# Patient Record
Sex: FEMALE | Race: OTHER | NOT HISPANIC OR LATINO | Employment: FULL TIME | URBAN - METROPOLITAN AREA
[De-identification: names, ages, dates, MRNs, and addresses within clinical notes are randomized per-mention and may not be internally consistent; named-entity substitution may affect disease eponyms.]

---

## 2017-02-22 LAB
25(OH)D3+25(OH)D2 SERPL-MCNC: 24.6 NG/ML (ref 30–100)
ALBUMIN SERPL-MCNC: 4.3 G/DL (ref 3.5–5.5)
ALBUMIN/CREAT UR: <11.9 MG/G CREAT (ref 0–30)
ALBUMIN/GLOB SERPL: 1.6 {RATIO} (ref 1.1–2.5)
ALP SERPL-CCNC: 85 IU/L (ref 39–117)
ALT SERPL-CCNC: 14 IU/L (ref 0–32)
AST SERPL-CCNC: 15 IU/L (ref 0–40)
BILIRUB SERPL-MCNC: 0.5 MG/DL (ref 0–1.2)
BUN SERPL-MCNC: 15 MG/DL (ref 6–24)
BUN/CREAT SERPL: 25 (ref 9–23)
CALCIUM SERPL-MCNC: 9.1 MG/DL (ref 8.7–10.2)
CHLORIDE SERPL-SCNC: 102 MMOL/L (ref 96–106)
CHOLEST SERPL-MCNC: 158 MG/DL (ref 100–199)
CO2 SERPL-SCNC: 25 MMOL/L (ref 18–29)
COMMENT 011824: NORMAL
CREAT SERPL-MCNC: 0.6 MG/DL (ref 0.57–1)
CREAT UR-MCNC: 25.3 MG/DL
GLOBULIN SER CALC-MCNC: 2.7 G/DL (ref 1.5–4.5)
GLUCOSE SERPL-MCNC: 96 MG/DL (ref 65–99)
HDLC SERPL-MCNC: 51 MG/DL
LDLC SERPL CALC-MCNC: 83 MG/DL (ref 0–99)
MICROALBUMIN UR-MCNC: <3 UG/ML
POTASSIUM SERPL-SCNC: 4.8 MMOL/L (ref 3.5–5.2)
PROT SERPL-MCNC: 7 G/DL (ref 6–8.5)
SODIUM SERPL-SCNC: 142 MMOL/L (ref 134–144)
T4 FREE SERPL-MCNC: 1.75 NG/DL (ref 0.82–1.77)
TRIGL SERPL-MCNC: 119 MG/DL (ref 0–149)
TSH SERPL DL<=0.005 MIU/L-ACNC: 0.43 UIU/ML (ref 0.45–4.5)
VLDLC SERPL CALC-MCNC: 24 MG/DL (ref 5–40)

## 2017-02-27 ENCOUNTER — OFFICE VISIT (OUTPATIENT)
Dept: MEDICAL GROUP | Facility: PHYSICIAN GROUP | Age: 52
End: 2017-02-27
Payer: COMMERCIAL

## 2017-02-27 VITALS
HEART RATE: 64 BPM | TEMPERATURE: 97 F | HEIGHT: 65 IN | OXYGEN SATURATION: 99 % | SYSTOLIC BLOOD PRESSURE: 122 MMHG | BODY MASS INDEX: 28.16 KG/M2 | WEIGHT: 169 LBS | DIASTOLIC BLOOD PRESSURE: 90 MMHG

## 2017-02-27 DIAGNOSIS — J45.20 MILD INTERMITTENT ASTHMA WITHOUT COMPLICATION: ICD-10-CM

## 2017-02-27 DIAGNOSIS — Z23 NEED FOR DIPHTHERIA-TETANUS-PERTUSSIS (TDAP) VACCINE, ADULT/ADOLESCENT: ICD-10-CM

## 2017-02-27 DIAGNOSIS — I10 ESSENTIAL HYPERTENSION: ICD-10-CM

## 2017-02-27 DIAGNOSIS — E03.9 HYPOTHYROIDISM (ACQUIRED): ICD-10-CM

## 2017-02-27 DIAGNOSIS — J30.89 SEASONAL ALLERGIC RHINITIS DUE TO OTHER ALLERGIC TRIGGER: ICD-10-CM

## 2017-02-27 PROCEDURE — 90471 IMMUNIZATION ADMIN: CPT | Performed by: FAMILY MEDICINE

## 2017-02-27 PROCEDURE — 99214 OFFICE O/P EST MOD 30 MIN: CPT | Mod: 25 | Performed by: FAMILY MEDICINE

## 2017-02-27 PROCEDURE — 90715 TDAP VACCINE 7 YRS/> IM: CPT | Performed by: FAMILY MEDICINE

## 2017-02-27 RX ORDER — ALBUTEROL SULFATE 90 UG/1
2 AEROSOL, METERED RESPIRATORY (INHALATION) EVERY 6 HOURS PRN
Qty: 8.5 G | Refills: 3 | Status: SHIPPED | OUTPATIENT
Start: 2017-02-27 | End: 2018-06-21 | Stop reason: SDUPTHER

## 2017-02-27 RX ORDER — LEVOTHYROXINE SODIUM 0.12 MG/1
TABLET ORAL
Qty: 90 TAB | Refills: 3 | Status: SHIPPED | OUTPATIENT
Start: 2017-02-27 | End: 2018-06-21

## 2017-02-27 RX ORDER — LISINOPRIL AND HYDROCHLOROTHIAZIDE 25; 20 MG/1; MG/1
TABLET ORAL
Qty: 90 TAB | Refills: 3 | Status: SHIPPED | OUTPATIENT
Start: 2017-02-27 | End: 2018-04-19 | Stop reason: SDUPTHER

## 2017-02-27 NOTE — PROGRESS NOTES
Subjective:   Addie Ayala is a 51 y.o. female here today for HTN; thyroid; allergies; asthma    Seasonal allergies  On Going issue. Patient states that she occasionally still has a runny nose with sinus congestion. She does utilize Flonase on occasion. She states that she otherwise has no symptoms such as headache or fevers associated with this. But she is curious if there are any other treatments she could try    Mild intermittent asthma without complication  Ongoing issue. Patient reports that currently she only needs her albuterol inhaler 2-3 times a month. She states that this only occurs if there is a significant weather change which makes her allergies worse. Otherwise she does well denies any issues with shortness of breath or cough.    Hypothyroidism (acquired)  Ongoing issue. Patient reports 100% compliance with levothyroxine 125 µg daily. Patient denies any issues with temperature intolerance, skin changes, anxiety. Chart review shows that while her TSH is slightly below the normal range her T4 is normal. Since patient is asymptomatic, we'll continue her current dosage.    Hypertension  Ongoing issue. Patient reports 100% compliance with lisinopril/hydrochlorothiazide 20/25 mg daily. Patient denies any issues with headache, dizziness, chest pain. Chart review shows that her blood pressure and heart rate both are in the normal range.     Pt is present with her daughter today.    Current medicines (including changes today)  Current Outpatient Prescriptions   Medication Sig Dispense Refill   • albuterol 108 (90 BASE) MCG/ACT Aero Soln inhalation aerosol Inhale 2 Puffs by mouth every 6 hours as needed for Shortness of Breath. 8.5 g 3   • lisinopril-hydrochlorothiazide (PRINZIDE, ZESTORETIC) 20-25 MG per tablet TAKE ONE TABLET BY MOUTH ONCE DAILY 90 Tab 3   • levothyroxine (SYNTHROID) 125 MCG Tab TAKE ONE TABLET BY MOUTH ONCE DAILY IN THE MORNING ON  AN  EMPTY  STOMACH 90 Tab 3   • fluticasone (FLONASE) 50  "MCG/ACT nasal spray Spray 1 Spray in nose every day. 16 g 4     No current facility-administered medications for this visit.     She  has a past medical history of Hypertension; Thyroid disease; Allergy; and Asthma.    ROS   No chest pain, no shortness of breath, no abdominal pain  +brittle nails; sinus congestion     Objective:     Blood pressure 122/90, pulse 64, temperature 36.1 °C (97 °F), height 1.651 m (5' 5\"), weight 76.658 kg (169 lb), SpO2 99 %. Body mass index is 28.12 kg/(m^2).   Physical Exam:  Alert, oriented in no acute distress.  Eye contact is good, speech goal directed, affect calm  HEENT: conjunctiva non-injected, sclera non-icteric.  Pinna normal. TM pearly gray but with faint serous drainage noted behind the R TM  Oral mucous membranes pink and moist with no lesions.  Neck No adenopathy or masses in the neck or supraclavicular regions.  Lungs: clear to auscultation bilaterally with good excursion.  CV: regular rate and rhythm.  Abdomen: soft, nontender, No CVAT  Ext: no edema, color normal, vascularity normal, temperature normal  Neuro: CN 2-12 grossly intact      Assessment and Plan:   The following treatment plan was discussed     1. Seasonal allergic rhinitis due to other allergic trigger      Stable. Continue current medications; recommend use of Netipot as needed to help with symptom management. Monitor   2. Hypothyroidism (acquired)      Stable. Continue current medications; refills provided. Monitor   3. Essential hypertension      Stable. Continue current medications; refills provided. Monitor   4. Mild intermittent asthma without complication      Stable. Continue current medications; refills provided. Monitor   5. Need for diphtheria-tetanus-pertussis (Tdap) vaccine, adult/adolescent  Tdap =>6yo IM    Age-appropriate immunization provided; patient tolerated procedure well.       Followup: Return in about 1 year (around 2/27/2018) for HTN/thyroid/labs, Short.            "

## 2017-02-27 NOTE — MR AVS SNAPSHOT
"        Addie Ayala   2017 7:40 AM   Office Visit   MRN: [id number]    Department:  [facility]   Dept Phone:  [phone]    Description:  Female : 1965   Provider:  Elisabeth Smith D.O.           Reason for Visit     Follow-Up           Allergies as of 2017     No Known Allergies      You were diagnosed with     Seasonal allergic rhinitis due to other allergic trigger   [3091469]       Hypothyroidism (acquired)   [374108]       Essential hypertension   [4193894]       Mild intermittent asthma without complication   [682657]       Need for diphtheria-tetanus-pertussis (Tdap) vaccine, adult/adolescent   [603016]         Vital Signs     Blood Pressure Pulse Temperature Height Weight Body Mass Index    122/90 mmHg 64 36.1 °C (97 °F) 1.651 m (5' 5\") 76.658 kg (169 lb) 28.12 kg/m2    Oxygen Saturation Smoking Status                99% Never Smoker           Basic Information     Date Of Birth Sex Race Ethnicity Preferred Language    1965 Female Other Non- English      Problem List              ICD-10-CM Priority Class Noted - Resolved    Hypothyroidism (acquired) E03.9   2015 - Present    Hypertension I10   2015 - Present    Multiple allergies Z88.9   2015 - Present    Right foot pain M79.671   2015 - Present    Mild intermittent asthma without complication J45.20   2016 - Present    Seasonal allergies J30.2   2016 - Present      Health Maintenance        Date Due Completion Dates    IMM DTaP/Tdap/Td Vaccine (1 - Tdap) 1984 ---    IMM PNEUMOCOCCAL 19-64 (ADULT) MEDIUM RISK SERIES (1 of 1 - PPSV23) 1984 ---    IMM INFLUENZA (1) 2016 ---    MAMMOGRAM 2017    PAP SMEAR 10/7/2019 10/7/2016    COLONOSCOPY 2026            Current Immunizations     Tdap Vaccine  Incomplete      Below and/or attached are the medications your provider expects you to take. Review all of your home medications and newly ordered " medications with your provider and/or pharmacist. Follow medication instructions as directed by your provider and/or pharmacist. Please keep your medication list with you and share with your provider. Update the information when medications are discontinued, doses are changed, or new medications (including over-the-counter products) are added; and carry medication information at all times in the event of emergency situations     Allergies:  No Known Allergies          Medications  Valid as of: February 27, 2017 -  7:54 AM    Generic Name Brand Name Tablet Size Instructions for use    Albuterol Sulfate (Aero Soln) albuterol 108 (90 BASE) MCG/ACT Inhale 2 Puffs by mouth every 6 hours as needed for Shortness of Breath.        Fluticasone Propionate (Suspension) FLONASE 50 MCG/ACT Spray 1 Spray in nose every day.        Levothyroxine Sodium (Tab) SYNTHROID 125 MCG TAKE ONE TABLET BY MOUTH ONCE DAILY IN THE MORNING ON  AN  EMPTY  STOMACH        Lisinopril-Hydrochlorothiazide (Tab) PRINZIDE, ZESTORETIC 20-25 MG TAKE ONE TABLET BY MOUTH ONCE DAILY        .                 Medicines prescribed today were sent to:     [facility] [address] [place] [id number]    Phone: [phone] Fax: [phone]    Open 24 Hours?: No      Medication refill instructions:       If your prescription bottle indicates you have medication refills left, it is not necessary to call your provider’s office. Please contact your pharmacy and they will refill your medication.    If your prescription bottle indicates you do not have any refills left, you may request refills at any time through one of the following ways: The online [employer] system (except Urgent Care), by calling your provider’s office, or by asking your pharmacy to contact your provider’s office with a refill request. Medication refills are processed only during regular business hours and may not be available until the next  business day. Your provider may request additional information or to have a follow-up visit with you prior to refilling your medication.   *Please Note: Medication refills are assigned a new Rx number when refilled electronically. Your pharmacy may indicate that no refills were authorized even though a new prescription for the same medication is available at the pharmacy. Please request the medicine by name with the pharmacy before contacting your provider for a refill.           Goby Access Code: [email or web address]  Expires: 3/29/2017  7:54 AM    Goby  A secure, online tool to manage your health information     [email or web address]’s Goby® is a secure, online tool that connects you to your personalized health information from the privacy of your home -- day or night - making it very easy for you to manage your healthcare. Once the activation process is completed, you can even access your medical information using the Goby lexi, which is available for free in the Apple Lexi store or Google Play store.     Goby provides the following levels of access (as shown below):   My Chart Features   [facility] Doctor [facility]  Specialists [facility]  Urgent  Care [facility]  Primary Care  Doctor   Email your healthcare team securely and privately 24/7 X X X    Manage appointments: schedule your next appointment; view details of past/upcoming appointments X      Request prescription refills. X      View recent personal medical records, including lab and immunizations X X X X   View health record, including health history, allergies, medications X X X X   Read reports about your outpatient visits, procedures, consult and ER notes X X X X   See your discharge summary, which is a recap of your hospital and/or ER visit that includes your diagnosis, lab results, and care plan. X X       How to register for Goby:  1. Go to  [email or web address].  2. Click on the Sign Up Now box, which takes you to the New Member Sign  Up page. You will need to provide the following information:  a. Enter your Tapingo Access Code exactly as it appears at the top of this page. (You will not need to use this code after you’ve completed the sign-up process. If you do not sign up before the expiration date, you must request a new code.)   b. Enter your date of birth.   c. Enter your home email address.   d. Click Submit, and follow the next screen’s instructions.  3. Create a Tapingo ID. This will be your Tapingo login ID and cannot be changed, so think of one that is secure and easy to remember.  4. Create a Tapingo password. You can change your password at any time.  5. Enter your Password Reset Question and Answer. This can be used at a later time if you forget your password.   6. Enter your e-mail address. This allows you to receive e-mail notifications when new information is available in Tapingo.  7. Click Sign Up. You can now view your health information.    For assistance activating your Tapingo account, call [phone]

## 2017-02-27 NOTE — ASSESSMENT & PLAN NOTE
Ongoing issue. Patient reports that currently she only needs her albuterol inhaler 2-3 times a month. She states that this only occurs if there is a significant weather change which makes her allergies worse. Otherwise she does well denies any issues with shortness of breath or cough.

## 2017-02-27 NOTE — ASSESSMENT & PLAN NOTE
Ongoing issue. Patient reports 100% compliance with lisinopril/hydrochlorothiazide 20/25 mg daily. Patient denies any issues with headache, dizziness, chest pain. Chart review shows that her blood pressure and heart rate both are in the normal range.

## 2017-02-27 NOTE — ASSESSMENT & PLAN NOTE
Ongoing issue. Patient reports 100% compliance with levothyroxine 125 µg daily. Patient denies any issues with temperature intolerance, skin changes, anxiety. Chart review shows that while her TSH is slightly below the normal range her T4 is normal. Since patient is asymptomatic, we'll continue her current dosage.

## 2017-02-27 NOTE — ASSESSMENT & PLAN NOTE
On Going issue. Patient states that she occasionally still has a runny nose with sinus congestion. She does utilize Flonase on occasion. She states that she otherwise has no symptoms such as headache or fevers associated with this. But she is curious if there are any other treatments she could try

## 2018-06-21 ENCOUNTER — OFFICE VISIT (OUTPATIENT)
Dept: MEDICAL GROUP | Facility: PHYSICIAN GROUP | Age: 53
End: 2018-06-21
Payer: COMMERCIAL

## 2018-06-21 VITALS
BODY MASS INDEX: 28.49 KG/M2 | RESPIRATION RATE: 16 BRPM | OXYGEN SATURATION: 96 % | TEMPERATURE: 97.2 F | SYSTOLIC BLOOD PRESSURE: 134 MMHG | DIASTOLIC BLOOD PRESSURE: 86 MMHG | HEIGHT: 65 IN | HEART RATE: 64 BPM | WEIGHT: 171 LBS

## 2018-06-21 DIAGNOSIS — R35.0 INCREASED URINARY FREQUENCY: ICD-10-CM

## 2018-06-21 DIAGNOSIS — E03.9 HYPOTHYROIDISM (ACQUIRED): ICD-10-CM

## 2018-06-21 DIAGNOSIS — J45.20 MILD INTERMITTENT ASTHMA WITHOUT COMPLICATION: ICD-10-CM

## 2018-06-21 DIAGNOSIS — I10 ESSENTIAL HYPERTENSION: ICD-10-CM

## 2018-06-21 PROCEDURE — 99214 OFFICE O/P EST MOD 30 MIN: CPT | Performed by: FAMILY MEDICINE

## 2018-06-21 RX ORDER — LISINOPRIL AND HYDROCHLOROTHIAZIDE 25; 20 MG/1; MG/1
TABLET ORAL
Qty: 90 TAB | Refills: 3 | Status: SHIPPED | OUTPATIENT
Start: 2018-06-21

## 2018-06-21 RX ORDER — LEVOTHYROXINE SODIUM 112 UG/1
112 TABLET ORAL
Qty: 90 TAB | Refills: 3 | Status: SHIPPED | OUTPATIENT
Start: 2018-06-21

## 2018-06-21 RX ORDER — LEVOTHYROXINE SODIUM 0.12 MG/1
TABLET ORAL
Qty: 90 TAB | Refills: 3 | Status: CANCELLED | OUTPATIENT
Start: 2018-06-21

## 2018-06-21 RX ORDER — ALBUTEROL SULFATE 90 UG/1
2 AEROSOL, METERED RESPIRATORY (INHALATION) EVERY 6 HOURS PRN
Qty: 8.5 G | Refills: 3 | Status: SHIPPED | OUTPATIENT
Start: 2018-06-21

## 2018-06-21 NOTE — PROGRESS NOTES
"Subjective:   Addie Ayala is a 52 y.o. female here today for lab review, medication refill    HPI :    Hypertension  Checks BP at home. Usually < 130/80. Blood pressure in clinic today slightly elevated.    Hypothyroidism (acquired)  Patient is currently taking levothyroxine 125 µg daily. She brought in lab results done recently. TSH is low at 0.16. Denies fatigue, constipation and diarrhea, no changes, skin changes, heat or cold intolerance.    Increased urinary frequency  Reports that she has been having increased urinary frequency mostly at nighttime when she has to wake up about 3 times at night to urinate. She does try to limit water intake around bedtime. Denies any dysuria, abdominal pain. She did have recent labs which does not show any evidence of prediabetes or diabetes. Does not report urinary leakage.    Mild intermittent asthma without complication  Fairly well controlled with very rare use of albuterol inhaler. She needs a refill today.         Current medicines (including changes today)  Current Outpatient Prescriptions   Medication Sig Dispense Refill   • lisinopril-hydrochlorothiazide (PRINZIDE, ZESTORETIC) 20-25 MG per tablet TAKE ONE TABLET BY MOUTH ONCE DAILY 90 Tab 3   • albuterol 108 (90 Base) MCG/ACT Aero Soln inhalation aerosol Inhale 2 Puffs by mouth every 6 hours as needed for Shortness of Breath. 8.5 g 3   • levothyroxine (SYNTHROID) 112 MCG Tab Take 1 Tab by mouth Every morning on an empty stomach. 90 Tab 3   • fluticasone (FLONASE) 50 MCG/ACT nasal spray Spray 1 Spray in nose every day. 16 g 4     No current facility-administered medications for this visit.      She  has a past medical history of Allergy; Asthma; Hypertension; and Thyroid disease.    ROS   No chest pain, no shortness of breath, no abdominal pain       Objective:     Blood pressure 134/86, pulse 64, temperature 36.2 °C (97.2 °F), resp. rate 16, height 1.651 m (5' 5\"), weight 77.6 kg (171 lb), SpO2 96 %. Body mass index " is 28.46 kg/m².     PHYSICAL EXAM     GEN: Alert and oriented,well appearing, no acute distress  SKIN: warm, dry to touch, no rashes or lesions in visible areas  PSYCH: mood and affect normal, judgement normal  EYES: Conjunctiva clear, lids normal,pupils equal round and reactive  ENMT: Normal external nose and ears,EACs normal appearing, TM pearly gray with normal light reflex bilaterally,nasal mucosa and turbinates normal appearing without erythema or edema, lips without lesions,good dentition,oropharynx clear  Neck : Trachea midline, no masses or swelling, no thyromegaly  LYMPHATIC : No cervical or supraclavicular lymphadenopathy  RESPIRATORY : Unlabored respiratory effort, no distress noted, clear to auscultation bilaterally, no wheeze, rhonchi, crackles  CARDIOVASCULAR: RRR, S1 S2 normal, no murmurs , gallop , no carotid bruit, no edema of the extremities  MUSCULOSKELETAL : Normal gait and stance, no obvious abnormalities   NEURO: No overt focal neurologic deficits,sensation intact        Assessment and Plan:   The following treatment plan was discussed    1. Essential hypertension  This is a chronic medical condition.Controlled based on home BP readings.Continue current medications.Refilled Rx  - lisinopril-hydrochlorothiazide (PRINZIDE, ZESTORETIC) 20-25 MG per tablet; TAKE ONE TABLET BY MOUTH ONCE DAILY  Dispense: 90 Tab; Refill: 3    2. Hypothyroidism (acquired)  Chronic.Uncontrolled.TSH over suppressed. Will reduce levothyroxine 112 µg daily. Repeat TSH in 2 months.  - levothyroxine (SYNTHROID) 112 MCG Tab; Take 1 Tab by mouth Every morning on an empty stomach.  Dispense: 90 Tab; Refill: 3  - TSH WITH REFLEX TO FT4; Future    3. Mild intermittent asthma without complication  Chronic. Well controlled. Continue albuterol inhaler as needed.  - albuterol 108 (90 Base) MCG/ACT Aero Soln inhalation aerosol; Inhale 2 Puffs by mouth every 6 hours as needed for Shortness of Breath.  Dispense: 8.5 g; Refill: 3    4.  Increased urinary frequency  Will check urinalysis. Advised to limit fluid intake at least 3 hours before bedtime, bladder emptying before going to bed. Monitor.  - URINALYSIS; Future      Followup: Return in about 6 months (around 12/21/2018) for follow up on chronic med conditions.         Please note that this dictation was created using voice recognition software. I have made every reasonable attempt to correct obvious errors, but I expect that there are errors of grammar and possibly content that I did not discover before finalizing the note.

## 2018-06-21 NOTE — ASSESSMENT & PLAN NOTE
Patient is currently taking levothyroxine 125 µg daily. She brought in lab results done recently. TSH is low at 0.16. Denies fatigue, constipation and diarrhea, no changes, skin changes, heat or cold intolerance.

## 2018-06-21 NOTE — ASSESSMENT & PLAN NOTE
Reports that she has been having increased urinary frequency mostly at nighttime when she has to wake up about 3 times at night to urinate. She does try to limit water intake around bedtime. Denies any dysuria, abdominal pain. She did have recent labs which does not show any evidence of prediabetes or diabetes. Does not report urinary leakage.

## 2021-08-11 ENCOUNTER — HOSPITAL ENCOUNTER (OUTPATIENT)
Dept: RADIOLOGY | Facility: MEDICAL CENTER | Age: 56
End: 2021-08-11
Attending: FAMILY MEDICINE
Payer: OTHER MISCELLANEOUS

## 2021-08-11 DIAGNOSIS — Z12.31 VISIT FOR SCREENING MAMMOGRAM: ICD-10-CM

## 2021-08-11 PROCEDURE — 77063 BREAST TOMOSYNTHESIS BI: CPT
